# Patient Record
Sex: MALE | Race: WHITE | NOT HISPANIC OR LATINO
[De-identification: names, ages, dates, MRNs, and addresses within clinical notes are randomized per-mention and may not be internally consistent; named-entity substitution may affect disease eponyms.]

---

## 2019-09-04 ENCOUNTER — TRANSCRIPTION ENCOUNTER (OUTPATIENT)
Age: 74
End: 2019-09-04

## 2019-11-05 ENCOUNTER — EMERGENCY (EMERGENCY)
Facility: HOSPITAL | Age: 74
LOS: 1 days | Discharge: HOME | End: 2019-11-05
Attending: EMERGENCY MEDICINE | Admitting: EMERGENCY MEDICINE
Payer: MEDICARE

## 2019-11-05 VITALS
TEMPERATURE: 98 F | RESPIRATION RATE: 20 BRPM | DIASTOLIC BLOOD PRESSURE: 93 MMHG | HEART RATE: 102 BPM | OXYGEN SATURATION: 98 % | SYSTOLIC BLOOD PRESSURE: 149 MMHG

## 2019-11-05 PROCEDURE — 73630 X-RAY EXAM OF FOOT: CPT | Mod: 26,LT

## 2019-11-05 PROCEDURE — 99283 EMERGENCY DEPT VISIT LOW MDM: CPT

## 2019-11-05 PROCEDURE — 73610 X-RAY EXAM OF ANKLE: CPT | Mod: 26,LT

## 2019-11-05 RX ORDER — IBUPROFEN 200 MG
600 TABLET ORAL ONCE
Refills: 0 | Status: COMPLETED | OUTPATIENT
Start: 2019-11-05 | End: 2019-11-05

## 2019-11-05 RX ORDER — ACETAMINOPHEN 500 MG
975 TABLET ORAL ONCE
Refills: 0 | Status: COMPLETED | OUTPATIENT
Start: 2019-11-05 | End: 2019-11-05

## 2019-11-05 NOTE — ED PROVIDER NOTE - PROGRESS NOTE DETAILS
Ling, pgy3: left message at pt's methadone tx center for confirmation of pt's methadone dose, no  on call to speak to in person, awaiting callback from message left on machine ADARSH: X-rays negative.  Patient ambulating with cane. Will discharge with podiatry follow up.  No signs or fx of opioid withdrawal at this time.  Patient states he will go to program tomorrow.

## 2019-11-05 NOTE — ED PROVIDER NOTE - NSFOLLOWUPINSTRUCTIONS_ED_ALL_ED_FT
1.  Please rest, ice, and elevate foot at home.  Take tylenol 500 mg every 6-8 hours as needed for pain.   2.  Return to care for increased swelling, pain, redness, fevers, chills, numbness, weakness.    3.  Follow up with podiatry clinic as early as able.

## 2019-11-05 NOTE — ED ADULT TRIAGE NOTE - CHIEF COMPLAINT QUOTE
pt ambulatory to Er coming with left ankle pain, and chronic right knee pain, pt stated also takes Methadone was not able to go this AM because he was coming to ER for left leg pain.

## 2019-11-05 NOTE — ED PROVIDER NOTE - OBJECTIVE STATEMENT
74y m p/w L ankle pain for 1 wk. Pt states that he has had pain in his L ankle w/ ambulation over the last week, he thinks he may have tweaked it while getting off the bus recently. Denies any falls or other notable trauma. Pt has chronic R knee pain with R leg in a mobile brace, which he thinks has been making him put more weight on his L leg.

## 2019-11-05 NOTE — ED PROVIDER NOTE - ATTENDING CONTRIBUTION TO CARE
Attending Attestation: Dr. Valerio  I have personally performed a history and physical examination of the patient and discussed management with the resident as well as the patient.  I reviewed the resident's note and agree with the documented findings and plan of care.  I have authored and modified critical sections of the Provider Note, including but not limited to HPI, Physical Exam and MDM.  dr booth  74 yo male co left ankle pain x 1 wk, might have twisted on bus at that time, no paresthesias, co pain w wt bearing, chronic rt knee/LE pain, knee in brace.    PE vss nad, left ankle mild chronic edema, nvi, mild lat mall tender, no deformity no instability no laxity, wt bearing w cane, no fibular head, achilles tender  mdm left ankle pain, check xray, RICE, fu with ortho

## 2019-11-05 NOTE — ED PROVIDER NOTE - NSCAREINITIATED _GEN_ER
Discussed the importance of blood pressure control in the prevention of ocular complications. Mike Valerio(Attending)

## 2019-11-05 NOTE — ED PROVIDER NOTE - PATIENT PORTAL LINK FT
You can access the FollowMyHealth Patient Portal offered by [facility] by registering at the following website: [email or web address]. By joining [facility]’s FollowMyHealth portal, you will also be able to view your health information using other applications (apps) compatible with our system.

## 2019-11-05 NOTE — ED PROVIDER NOTE - NSFOLLOWUPCLINICS_GEN_ALL_ED_FT
[facility]  Podiatry  [address] [place] [address] - [address] [place] [address]  Phone: [phone]  Fax:   Follow Up Time: 1-3 Days

## 2019-11-22 ENCOUNTER — TRANSCRIPTION ENCOUNTER (OUTPATIENT)
Age: 74
End: 2019-11-22

## 2022-01-01 NOTE — ED PROVIDER NOTE - SHIFT CHANGE
Yes... ,[email or web address] ,[email or web address] [email or web address]

## 2022-02-21 ENCOUNTER — TRANSCRIPTION ENCOUNTER (OUTPATIENT)
Age: 77
End: 2022-02-21

## 2022-02-22 ENCOUNTER — RESULT REVIEW (OUTPATIENT)
Age: 77
End: 2022-02-22

## 2022-03-03 ENCOUNTER — TRANSCRIPTION ENCOUNTER (OUTPATIENT)
Age: 77
End: 2022-03-03

## 2022-03-04 ENCOUNTER — RESULT REVIEW (OUTPATIENT)
Age: 77
End: 2022-03-04

## 2022-03-11 ENCOUNTER — TRANSCRIPTION ENCOUNTER (OUTPATIENT)
Age: 77
End: 2022-03-11

## 2022-04-28 PROBLEM — Z00.00 ENCOUNTER FOR PREVENTIVE HEALTH EXAMINATION: Status: ACTIVE | Noted: 2022-04-28

## 2022-04-29 ENCOUNTER — APPOINTMENT (OUTPATIENT)
Dept: VASCULAR SURGERY | Facility: CLINIC | Age: 77
End: 2022-04-29
Payer: MEDICARE

## 2022-04-29 VITALS
SYSTOLIC BLOOD PRESSURE: 125 MMHG | OXYGEN SATURATION: 100 % | BODY MASS INDEX: 36.68 KG/M2 | HEIGHT: 68 IN | HEART RATE: 66 BPM | WEIGHT: 242 LBS | TEMPERATURE: 98.2 F | DIASTOLIC BLOOD PRESSURE: 60 MMHG

## 2022-04-29 DIAGNOSIS — Z78.9 OTHER SPECIFIED HEALTH STATUS: ICD-10-CM

## 2022-04-29 DIAGNOSIS — F11.20 OPIOID DEPENDENCE, UNCOMPLICATED: ICD-10-CM

## 2022-04-29 DIAGNOSIS — K59.00 CONSTIPATION, UNSPECIFIED: ICD-10-CM

## 2022-04-29 DIAGNOSIS — B37.2 CANDIDIASIS OF SKIN AND NAIL: ICD-10-CM

## 2022-04-29 PROCEDURE — 11042 DBRDMT SUBQ TIS 1ST 20SQCM/<: CPT | Mod: RT

## 2022-04-29 PROCEDURE — 99213 OFFICE O/P EST LOW 20 MIN: CPT | Mod: 25

## 2022-04-29 RX ORDER — METHADONE HYDROCHLORIDE 10 MG/1
10 TABLET ORAL
Refills: 0 | Status: ACTIVE | COMMUNITY

## 2022-04-29 RX ORDER — LIDOCAINE 4% 4 G/100G
4 CREAM TOPICAL
Refills: 0 | Status: ACTIVE | COMMUNITY

## 2022-04-29 RX ORDER — PETROLATUM,WHITE
OINTMENT IN PACKET (GRAM) TOPICAL
Refills: 0 | Status: ACTIVE | COMMUNITY

## 2022-04-29 RX ORDER — ACETAMINOPHEN 325 MG/10.15ML
325 SOLUTION ORAL
Refills: 0 | Status: ACTIVE | COMMUNITY

## 2022-04-29 RX ORDER — FERROUS SULFATE 325(65) MG
325 TABLET ORAL
Refills: 0 | Status: ACTIVE | COMMUNITY

## 2022-04-29 RX ORDER — PANTOPRAZOLE 40 MG/1
40 TABLET, DELAYED RELEASE ORAL
Refills: 0 | Status: ACTIVE | COMMUNITY

## 2022-04-29 RX ORDER — SODIUM HYPOCHLORITE 1.25 MG/ML
SOLUTION TOPICAL
Refills: 0 | Status: ACTIVE | COMMUNITY

## 2022-04-29 RX ORDER — LACTULOSE 10 G/10G
10 SOLUTION ORAL
Refills: 0 | Status: ACTIVE | COMMUNITY

## 2022-04-29 RX ORDER — SENNOSIDES 8.6 MG TABLETS 8.6 MG/1
TABLET ORAL
Refills: 0 | Status: ACTIVE | COMMUNITY

## 2022-04-29 NOTE — PROCEDURE
[FreeTextEntry1] : Excisional debridement of the necrotic subcutaneous tissue and deep fascia of area of 1 x 1 cm done at the bedside and the necrotic slough is removed.

## 2022-04-29 NOTE — HISTORY OF PRESENT ILLNESS
[FreeTextEntry1] : This is a 76-year-old male who has been seen by me and operated by me at [facility] for necrotic left hip pressure ulcer.  The patient has multiple medical issues including alcoholic liver disease severe lymphedema of the both lower extremities and had presented to [facility] after being found on the floor for unknown period of time by her his son patient had extensive necrotic left hip ulcer which was debrided in the operating room and ulcer was deep up to the fascia and almost to the left hip bone with exposure of muscles and ligaments.  The patient also had developed right medial heel pressure ulcer which is necrotic which was large and and been treated conservatively upon discharge from the hospital patient has been at the [facility] and has come for the follow-up.  The left leg ulcer was treated with negative pressure wound treatment and has significantly improved.  The patient was once seen in the wound care center at [facility] but because of the insurance issues has now come to the office for the follow-up.  The patient states that he feels much better and is expected to go home in 2 to 3 weeks.  Patient has a history of drug abuse and is currently on methadone.

## 2022-04-29 NOTE — ASSESSMENT
[FreeTextEntry1] : Patient with improving left hip pressure ulcer with significant improvement with decrease in the size decrease in the drainage and increasing the healthy granulation tissue.

## 2022-06-03 ENCOUNTER — APPOINTMENT (OUTPATIENT)
Dept: VASCULAR SURGERY | Facility: CLINIC | Age: 77
End: 2022-06-03
Payer: MEDICARE

## 2022-06-03 VITALS
OXYGEN SATURATION: 100 % | SYSTOLIC BLOOD PRESSURE: 127 MMHG | DIASTOLIC BLOOD PRESSURE: 73 MMHG | BODY MASS INDEX: 36.68 KG/M2 | HEART RATE: 67 BPM | TEMPERATURE: 98.6 F | WEIGHT: 242 LBS | HEIGHT: 68 IN

## 2022-06-03 DIAGNOSIS — L89.224 PRESSURE ULCER OF LEFT HIP, STAGE 4: ICD-10-CM

## 2022-06-03 DIAGNOSIS — I89.0 LYMPHEDEMA, NOT ELSEWHERE CLASSIFIED: ICD-10-CM

## 2022-06-03 DIAGNOSIS — L89.90 PRESSURE ULCER OF UNSPECIFIED SITE, UNSPECIFIED STAGE: ICD-10-CM

## 2022-06-03 DIAGNOSIS — L89.614 PRESSURE ULCER OF RIGHT HEEL, STAGE 4: ICD-10-CM

## 2022-06-03 DIAGNOSIS — L03.116 CELLULITIS OF LEFT LOWER LIMB: ICD-10-CM

## 2022-06-03 DIAGNOSIS — K70.9 ALCOHOLIC LIVER DISEASE, UNSPECIFIED: ICD-10-CM

## 2022-06-03 PROCEDURE — 17250 CHEM CAUT OF GRANLTJ TISSUE: CPT | Mod: LT

## 2022-06-03 PROCEDURE — 99214 OFFICE O/P EST MOD 30 MIN: CPT | Mod: 25

## 2022-06-03 NOTE — ASSESSMENT
[FreeTextEntry1] : Patient with a stable slowly improving left hip pressure ulcer and significantly improved right heel ulcer.  Patient also has a hypergranular tissue in the left hip wound which is very prone for easy bleeding.

## 2022-06-03 NOTE — PROCEDURE
[FreeTextEntry1] : Chemical cauterization with silver nitrate is performed of the left hip stage IV ulcer with 2 x 2 centimeter area of hypergranulation tissue which is treated with silver nitrate and chemical cauterization is done.

## 2022-06-03 NOTE — HISTORY OF PRESENT ILLNESS
[FreeTextEntry1] : The patient has come to the office for the follow-up of his left hip stage IV pressure ulcer and right heel pressure ulcer.  Patient still lives at [facility] and also goes to a methadone clinic.\par Patient is receiving VAC dressing to his left hip at the nursing home and local wound care and offloading for his right heel.  The patient is here for the wound check

## 2022-07-01 ENCOUNTER — APPOINTMENT (OUTPATIENT)
Dept: VASCULAR SURGERY | Facility: CLINIC | Age: 77
End: 2022-07-01